# Patient Record
Sex: FEMALE | Race: WHITE | NOT HISPANIC OR LATINO | Employment: FULL TIME | URBAN - METROPOLITAN AREA
[De-identification: names, ages, dates, MRNs, and addresses within clinical notes are randomized per-mention and may not be internally consistent; named-entity substitution may affect disease eponyms.]

---

## 2017-07-12 ENCOUNTER — OFFICE VISIT (OUTPATIENT)
Dept: URGENT CARE | Facility: CLINIC | Age: 27
End: 2017-07-12
Payer: COMMERCIAL

## 2017-07-12 VITALS
DIASTOLIC BLOOD PRESSURE: 80 MMHG | SYSTOLIC BLOOD PRESSURE: 114 MMHG | OXYGEN SATURATION: 100 % | TEMPERATURE: 98 F | RESPIRATION RATE: 14 BRPM | HEART RATE: 70 BPM

## 2017-07-12 DIAGNOSIS — S05.01XA CORNEAL ABRASION, RIGHT, INITIAL ENCOUNTER: Primary | ICD-10-CM

## 2017-07-12 PROCEDURE — 99203 OFFICE O/P NEW LOW 30 MIN: CPT | Mod: S$GLB,,, | Performed by: FAMILY MEDICINE

## 2017-07-12 RX ORDER — DEXTROAMPHETAMINE SACCHARATE, AMPHETAMINE ASPARTATE, DEXTROAMPHETAMINE SULFATE AND AMPHETAMINE SULFATE 5; 5; 5; 5 MG/1; MG/1; MG/1; MG/1
20 TABLET ORAL
COMMUNITY
Start: 2017-06-13 | End: 2017-07-13

## 2017-07-12 RX ORDER — SERTRALINE HYDROCHLORIDE 100 MG/1
100 TABLET, FILM COATED ORAL
COMMUNITY
Start: 2017-03-27 | End: 2024-02-02 | Stop reason: SDUPTHER

## 2017-07-12 NOTE — PROGRESS NOTES
Subjective:       Patient ID: Svetlana Marroquin is a 26 y.o. female.    Chief Complaint: Eye Problem (PT C/O RIGHT EYE PRESSURE, DENIES ANY VISION CHANGES, MILD REDNESS NOTED, PT STATED A PIECE OF PAPER GOT IN HER EYE YESTERDAY, EYE BECAME IRRITATED AND WATERY, OTC EYE DROPS WITH MILD RELIEF)    Eye Problem    The right eye is affected. This is a new problem. The current episode started in the past 7 days. The problem occurs constantly. The problem has been unchanged. The injury mechanism was a foreign body. The pain is at a severity of 2/10. There is no known exposure to pink eye. She does not wear contacts. Associated symptoms include eye redness and itching. Pertinent negatives include no blurred vision, fever, nausea, photophobia or vomiting. She has tried eye drops for the symptoms. The treatment provided mild relief.     Review of Systems   Constitution: Negative for fever.   HENT: Negative for congestion and headaches.    Eyes: Positive for itching, pain and redness. Negative for blurred vision and photophobia.   Gastrointestinal: Negative for nausea and vomiting.       Objective:      Physical Exam   Eyes: Conjunctivae, EOM and lids are normal. Lids are everted and swept, no foreign bodies found. No foreign body present in the right eye.           Assessment:       1. Corneal abrasion, right, initial encounter        Plan:     gentamycin oinment installed and eye rest discussed with patient ,she will see opthalmology tomm am if needed

## 2017-07-12 NOTE — PROGRESS NOTES
Subjective:       Patient ID: Svetlana Marroquin is a 26 y.o. female.    Chief Complaint:   HPI  ROS    Objective:      Physical Exam    Assessment:       No diagnosis found.    Plan:

## 2018-06-07 ENCOUNTER — OFFICE VISIT (OUTPATIENT)
Dept: URGENT CARE | Facility: CLINIC | Age: 28
End: 2018-06-07
Payer: COMMERCIAL

## 2018-06-07 VITALS
WEIGHT: 153 LBS | HEART RATE: 120 BPM | TEMPERATURE: 99 F | BODY MASS INDEX: 22.66 KG/M2 | RESPIRATION RATE: 16 BRPM | DIASTOLIC BLOOD PRESSURE: 90 MMHG | OXYGEN SATURATION: 100 % | SYSTOLIC BLOOD PRESSURE: 131 MMHG | HEIGHT: 69 IN

## 2018-06-07 DIAGNOSIS — J32.9 RHINOSINUSITIS: ICD-10-CM

## 2018-06-07 DIAGNOSIS — J02.9 VIRAL PHARYNGITIS: Primary | ICD-10-CM

## 2018-06-07 LAB
CTP QC/QA: YES
S PYO RRNA THROAT QL PROBE: NEGATIVE

## 2018-06-07 PROCEDURE — 99214 OFFICE O/P EST MOD 30 MIN: CPT | Mod: 25,S$GLB,, | Performed by: NURSE PRACTITIONER

## 2018-06-07 PROCEDURE — 87880 STREP A ASSAY W/OPTIC: CPT | Mod: QW,S$GLB,, | Performed by: NURSE PRACTITIONER

## 2018-06-07 PROCEDURE — 96372 THER/PROPH/DIAG INJ SC/IM: CPT | Mod: S$GLB,,, | Performed by: NURSE PRACTITIONER

## 2018-06-07 RX ORDER — BETAMETHASONE SODIUM PHOSPHATE AND BETAMETHASONE ACETATE 3; 3 MG/ML; MG/ML
6 INJECTION, SUSPENSION INTRA-ARTICULAR; INTRALESIONAL; INTRAMUSCULAR; SOFT TISSUE
Status: COMPLETED | OUTPATIENT
Start: 2018-06-07 | End: 2018-06-07

## 2018-06-07 RX ADMIN — BETAMETHASONE SODIUM PHOSPHATE AND BETAMETHASONE ACETATE 6 MG: 3; 3 INJECTION, SUSPENSION INTRA-ARTICULAR; INTRALESIONAL; INTRAMUSCULAR; SOFT TISSUE at 06:06

## 2018-06-07 NOTE — PROGRESS NOTES
"Subjective:       Patient ID: Svetlana Marroquin is a 27 y.o. female.    Vitals:    06/07/18 1826   BP: (!) 131/90   Pulse: (!) 120   Resp: 16   Temp: 98.8 °F (37.1 °C)   SpO2: 100%   Weight: 69.4 kg (153 lb)   Height: 5' 9" (1.753 m)       Chief Complaint: Sore Throat    Pt state sore throat with some cough and congestion since yesterday.  HR TACHY AND BP ELEVATED READING-TAKING ADDERAL.      Sore Throat    This is a new problem. The current episode started yesterday. The problem has been gradually worsening. There has been no fever. The pain is at a severity of 5/10. Associated symptoms include congestion, coughing and swollen glands. Pertinent negatives include no abdominal pain, diarrhea, headaches, shortness of breath or vomiting. Treatments tried: day quil. The treatment provided no relief.     Review of Systems   Constitution: Positive for malaise/fatigue. Negative for chills and fever.   HENT: Positive for congestion and sore throat.    Eyes: Negative for blurred vision.   Cardiovascular: Negative for chest pain.   Respiratory: Positive for cough. Negative for shortness of breath.    Skin: Negative for rash.   Musculoskeletal: Negative for back pain and joint pain.   Gastrointestinal: Negative for abdominal pain, diarrhea, nausea and vomiting.   Neurological: Negative for headaches.   Psychiatric/Behavioral: The patient is not nervous/anxious.        Objective:      Physical Exam   Constitutional: She is oriented to person, place, and time. She appears well-developed and well-nourished. She is cooperative.  Non-toxic appearance. She does not appear ill. No distress.   HENT:   Head: Normocephalic and atraumatic.   Right Ear: Hearing, tympanic membrane, external ear and ear canal normal.   Left Ear: Hearing, tympanic membrane, external ear and ear canal normal.   Nose: Mucosal edema and rhinorrhea present. No nasal deformity. No epistaxis. Right sinus exhibits no maxillary sinus tenderness and no frontal " sinus tenderness. Left sinus exhibits no maxillary sinus tenderness and no frontal sinus tenderness.   Mouth/Throat: Uvula is midline and mucous membranes are normal. No trismus in the jaw. Normal dentition. No uvula swelling. Posterior oropharyngeal edema and posterior oropharyngeal erythema present.   Eyes: Conjunctivae and lids are normal. No scleral icterus.   Sclera clear bilat   Neck: Trachea normal, full passive range of motion without pain and phonation normal. Neck supple.   Cardiovascular: Regular rhythm, normal heart sounds, intact distal pulses and normal pulses.  Tachycardia present.    Pulmonary/Chest: Effort normal and breath sounds normal. No respiratory distress. She has no decreased breath sounds. She has no wheezes. She has no rhonchi. She has no rales.   Abdominal: Soft. Normal appearance and bowel sounds are normal. She exhibits no distension. There is no tenderness.   Musculoskeletal: Normal range of motion. She exhibits no edema or deformity.   Neurological: She is alert and oriented to person, place, and time. She exhibits normal muscle tone. Coordination normal.   Skin: Skin is warm, dry and intact. She is not diaphoretic. No pallor.   Psychiatric: She has a normal mood and affect. Her speech is normal and behavior is normal. Judgment and thought content normal. Cognition and memory are normal.   Nursing note and vitals reviewed.      Assessment:       1. Viral pharyngitis    2. Rhinosinusitis        Plan:     ADVISED PATIENT TO AVOID PSEUDOEPHEDRINE SINCE SHE HAS ELEVATED HEART RATE AND BLOOD PRESSURE-LIKELY DUE TO HER ADDERAL. ALSO DISCUSSED FOLLOWING UP WITH PCP REGARDING TACHYCARDIA.    Svetlana Chino was seen today for sore throat.    Diagnoses and all orders for this visit:    Viral pharyngitis  -     POCT rapid strep A  -     betamethasone acetate-betamethasone sodium phosphate injection 6 mg; Inject 1 mL (6 mg total) into the muscle one time.  -     (Magic mouthwash) 1:1:1 Benadryl  12.5mg/5ml liq, aluminum & magnesium hydroxide-simehticone (Maalox), lidocaine viscous 2%; Swish and spit 10 mLs every 4 (four) hours as needed.    Rhinosinusitis  -     betamethasone acetate-betamethasone sodium phosphate injection 6 mg; Inject 1 mL (6 mg total) into the muscle one time.      Patient Instructions     You received a steroid shot today - this can elevate your blood pressure, elevate your blood sugar, water weight gain, nervous energy, redness to the face and dimpling of the skin where the shot goes in.       Please drink plenty of fluids.     Please get plenty of rest.     Please return here or go to the Emergency Department for any concerns or worsening of condition.     Sore throat recommendations: Warm fluids, warm salt water gargles, throat lozenges, tea, honey, soup, rest, hydration.    ALTERNATE WARM SALT WATER GARGLES WITH RX MAGIC MOUTH WASH AS DIRECTED.     Use over the counter flonase: one spray each nostril twice daily OR two sprays each nostril once daily.      If you do not have Hypertension or any history of palpitations, it is ok to take over the counter Sudafed or Mucinex D or Allegra-D or Claritin-D or Zyrtec-D.     If you do take one of the above, it is ok to combine that with plain over the counter Mucinex/Robitussin or Allegra or Claritin or Zyrtec.  If for example you are taking Zyrtec -D, you can combine that with Mucinex/Robitussin, but not Mucinex-D.  If you are taking Mucinex-D, you can combine that with plain Allegra or Claritin or Zyrtec.      If you do have Hypertension or palpitations, it is safe to take Coricidin HBP for relief of sinus symptoms.     If not allergic, please take over the counter Tylenol (Acetaminophen) and/or Motrin (Ibuprofen) as directed for control of pain and/or fever.  Please follow up with your primary care doctor or specialist as needed.     If you  smoke, please stop smoking.          Viral Pharyngitis (Sore Throat)    You (or your child, if your  child is the patient) have pharyngitis (sore throat). This infection is caused by a virus. It can cause throat pain that is worse when swallowing, aching all over, headache, and fever. The infection may be spread by coughing, kissing, or touching others after touching your mouth or nose. Antibiotic medications do not work against viruses, so they are not used for treating this condition.  Home care  · If your symptoms are severe, rest at home. Return to work or school when you feel well enough.   · Drink plenty of fluids to avoid dehydration.  · For children: Use acetaminophen for fever, fussiness or discomfort. In infants over six months of age, you may use ibuprofen instead of acetaminophen. (NOTE: If your child has chronic liver or kidney disease or ever had a stomach ulcer or GI bleeding, talk with your doctor before using these medicines.) (NOTE: Aspirin should never be used in anyone under 18 years of age who is ill with a fever. It may cause severe liver damage.)   · For adults: You may use acetaminophen or ibuprofen to control pain or fever, unless another medicine was prescribed for this. (NOTE: If you have chronic liver or kidney disease or ever had a stomach ulcer or GI bleeding, talk with your doctor before using these medicines.)  · Throat lozenges or numbing throat sprays can help reduce pain. Gargling with warm salt water will also help reduce throat pain. For this, dissolve 1/2 teaspoon of salt in 1 glass of warm water. To help soothe a sore throat, children can sip on juice or a popsicle. Children 5 years and older can also suck on a lollipop or hard candy.  · Avoid salty or spicy foods, which can be irritating to the throat.  Follow-up care  Follow up with your healthcare provider or our staff if you are not improving over the next week.  When to seek medical advice  Call your healthcare provider right away if any of these occur:  · Fever as directed by your doctor.  For children, seek care  if:  ? Your child is of any age and has repeated fevers above 104°F (40°C).  ? Your child is younger than 2 years of age and has a fever of 100.4°F (38°C) that continues for more than 1 day.  ? Your child is 2 years old or older and has a fever of 100.4°F (38°C) that continues for more than 3 days.  · New or worsening ear pain, sinus pain, or headache  · Painful lumps in the back of neck  · Stiff neck  · Lymph nodes are getting larger  · Inability to swallow liquids, excessive drooling, or inability to open mouth wide due to throat pain  · Signs of dehydration (very dark urine or no urine, sunken eyes, dizziness)  · Trouble breathing or noisy breathing  · Muffled voice  · New rash  · Child appears to be getting sicker  Date Last Reviewed: 4/13/2015  © 1647-2689 [employer]. [address] [place] [address]. All rights reserved. This information is not intended as a substitute for professional medical care. Always follow your healthcare professional's instructions.       Self-Care for Sore Throats    Sore throats happen for many reasons, such as colds, allergies, and infections caused by viruses or bacteria. In any case, your throat becomes red and sore. Your goal for self-care is to reduce your discomfort while giving your throat a chance to heal.  Moisten and soothe your throat  Tips include the following:  · Try a sip of water first thing after waking up.  · Keep your throat moist by drinking 6 or more glasses of clear liquids every day.  · Run a cool-air humidifier in your room overnight.  · Avoid cigarette smoke.   · Suck on throat lozenges, cough drops, hard candy, ice chips, or frozen fruit-juice bars. Use the sugar-free versions if your diet or medical condition requires them.  Gargle to ease irritation  Gargling every hour or 2 can ease irritation. Try gargling with 1 of these solutions:  · 1/4 teaspoon of salt in 1/2 cup of warm water  · An over-the-counter anesthetic gargle  Use  medicine for more relief  Over-the-counter medicine can reduce sore throat symptoms. Ask your pharmacist if you have questions about which medicine to use:  · Ease pain with anesthetic sprays. Aspirin or an aspirin substitute also helps. Remember, never give aspirin to anyone 18 or younger, or if you are already taking blood thinners.   · For sore throats caused by allergies, try antihistamines to block the allergic reaction.  · Remember: unless a sore throat is caused by a bacterial infection, antibiotics wont help you.  Prevent future sore throats  Prevention tips include the following:  · Stop smoking or reduce contact with secondhand smoke. Smoke irritates the tender throat lining.  · Limit contact with pets and with allergy-causing substances, such as pollen and mold.  · When youre around someone with a sore throat or cold, wash your hands often to keep viruses or bacteria from spreading.  · Dont strain your vocal cords.  Call your healthcare provider  Contact your healthcare provider if you have:  · A temperature over 101°F (38.3°C)  · White spots on the throat  · Great difficulty swallowing  · Trouble breathing  · A skin rash  · Recent exposure to someone else with strep bacteria  · Severe hoarseness and swollen glands in the neck or jaw   Date Last Reviewed: 8/1/2016  © 8900-4429 [employer]. [address] [place] [address], [place] [address]. All rights reserved. This information is not intended as a substitute for professional medical care. Always follow your healthcare professional's instructions.

## 2018-06-07 NOTE — PATIENT INSTRUCTIONS
You received a steroid shot today - this can elevate your blood pressure, elevate your blood sugar, water weight gain, nervous energy, redness to the face and dimpling of the skin where the shot goes in.       Please drink plenty of fluids.     Please get plenty of rest.     Please return here or go to the Emergency Department for any concerns or worsening of condition.     Sore throat recommendations: Warm fluids, warm salt water gargles, throat lozenges, tea, honey, soup, rest, hydration.    ALTERNATE WARM SALT WATER GARGLES WITH RX MAGIC MOUTH WASH AS DIRECTED.     Use over the counter flonase: one spray each nostril twice daily OR two sprays each nostril once daily.      If you do not have Hypertension or any history of palpitations, it is ok to take over the counter Sudafed or Mucinex D or Allegra-D or Claritin-D or Zyrtec-D.     If you do take one of the above, it is ok to combine that with plain over the counter Mucinex/Robitussin or Allegra or Claritin or Zyrtec.  If for example you are taking Zyrtec -D, you can combine that with Mucinex/Robitussin, but not Mucinex-D.  If you are taking Mucinex-D, you can combine that with plain Allegra or Claritin or Zyrtec.      If you do have Hypertension or palpitations, it is safe to take Coricidin HBP for relief of sinus symptoms.     If not allergic, please take over the counter Tylenol (Acetaminophen) and/or Motrin (Ibuprofen) as directed for control of pain and/or fever.  Please follow up with your primary care doctor or specialist as needed.     If you  smoke, please stop smoking.          Viral Pharyngitis (Sore Throat)    You (or your child, if your child is the patient) have pharyngitis (sore throat). This infection is caused by a virus. It can cause throat pain that is worse when swallowing, aching all over, headache, and fever. The infection may be spread by coughing, kissing, or touching others after touching your mouth or nose. Antibiotic medications do not  work against viruses, so they are not used for treating this condition.  Home care  · If your symptoms are severe, rest at home. Return to work or school when you feel well enough.   · Drink plenty of fluids to avoid dehydration.  · For children: Use acetaminophen for fever, fussiness or discomfort. In infants over six months of age, you may use ibuprofen instead of acetaminophen. (NOTE: If your child has chronic liver or kidney disease or ever had a stomach ulcer or GI bleeding, talk with your doctor before using these medicines.) (NOTE: Aspirin should never be used in anyone under 18 years of age who is ill with a fever. It may cause severe liver damage.)   · For adults: You may use acetaminophen or ibuprofen to control pain or fever, unless another medicine was prescribed for this. (NOTE: If you have chronic liver or kidney disease or ever had a stomach ulcer or GI bleeding, talk with your doctor before using these medicines.)  · Throat lozenges or numbing throat sprays can help reduce pain. Gargling with warm salt water will also help reduce throat pain. For this, dissolve 1/2 teaspoon of salt in 1 glass of warm water. To help soothe a sore throat, children can sip on juice or a popsicle. Children 5 years and older can also suck on a lollipop or hard candy.  · Avoid salty or spicy foods, which can be irritating to the throat.  Follow-up care  Follow up with your healthcare provider or our staff if you are not improving over the next week.  When to seek medical advice  Call your healthcare provider right away if any of these occur:  · Fever as directed by your doctor.  For children, seek care if:  ? Your child is of any age and has repeated fevers above 104°F (40°C).  ? Your child is younger than 2 years of age and has a fever of 100.4°F (38°C) that continues for more than 1 day.  ? Your child is 2 years old or older and has a fever of 100.4°F (38°C) that continues for more than 3 days.  · New or worsening ear  pain, sinus pain, or headache  · Painful lumps in the back of neck  · Stiff neck  · Lymph nodes are getting larger  · Inability to swallow liquids, excessive drooling, or inability to open mouth wide due to throat pain  · Signs of dehydration (very dark urine or no urine, sunken eyes, dizziness)  · Trouble breathing or noisy breathing  · Muffled voice  · New rash  · Child appears to be getting sicker  Date Last Reviewed: 4/13/2015 © 2000-2017 [employer]. [address] [place] [address], [place] [address]. All rights reserved. This information is not intended as a substitute for professional medical care. Always follow your healthcare professional's instructions.       Self-Care for Sore Throats    Sore throats happen for many reasons, such as colds, allergies, and infections caused by viruses or bacteria. In any case, your throat becomes red and sore. Your goal for self-care is to reduce your discomfort while giving your throat a chance to heal.  Moisten and soothe your throat  Tips include the following:  · Try a sip of water first thing after waking up.  · Keep your throat moist by drinking 6 or more glasses of clear liquids every day.  · Run a cool-air humidifier in your room overnight.  · Avoid cigarette smoke.   · Suck on throat lozenges, cough drops, hard candy, ice chips, or frozen fruit-juice bars. Use the sugar-free versions if your diet or medical condition requires them.  Gargle to ease irritation  Gargling every hour or 2 can ease irritation. Try gargling with 1 of these solutions:  · 1/4 teaspoon of salt in 1/2 cup of warm water  · An over-the-counter anesthetic gargle  Use medicine for more relief  Over-the-counter medicine can reduce sore throat symptoms. Ask your pharmacist if you have questions about which medicine to use:  · Ease pain with anesthetic sprays. Aspirin or an aspirin substitute also helps. Remember, never give aspirin to anyone 18 or younger, or if you are already taking blood  thinners.   · For sore throats caused by allergies, try antihistamines to block the allergic reaction.  · Remember: unless a sore throat is caused by a bacterial infection, antibiotics wont help you.  Prevent future sore throats  Prevention tips include the following:  · Stop smoking or reduce contact with secondhand smoke. Smoke irritates the tender throat lining.  · Limit contact with pets and with allergy-causing substances, such as pollen and mold.  · When youre around someone with a sore throat or cold, wash your hands often to keep viruses or bacteria from spreading.  · Dont strain your vocal cords.  Call your healthcare provider  Contact your healthcare provider if you have:  · A temperature over 101°F (38.3°C)  · White spots on the throat  · Great difficulty swallowing  · Trouble breathing  · A skin rash  · Recent exposure to someone else with strep bacteria  · Severe hoarseness and swollen glands in the neck or jaw   Date Last Reviewed: 8/1/2016  © 5283-4407 [employer]. [address] [place] [address], [place] [address]. All rights reserved. This information is not intended as a substitute for professional medical care. Always follow your healthcare professional's instructions.

## 2020-04-21 DIAGNOSIS — Z01.84 ANTIBODY RESPONSE EXAMINATION: ICD-10-CM

## 2020-05-21 DIAGNOSIS — Z01.84 ANTIBODY RESPONSE EXAMINATION: ICD-10-CM

## 2020-06-20 DIAGNOSIS — Z01.84 ANTIBODY RESPONSE EXAMINATION: ICD-10-CM

## 2020-07-20 DIAGNOSIS — Z01.84 ANTIBODY RESPONSE EXAMINATION: ICD-10-CM

## 2020-08-19 DIAGNOSIS — Z01.84 ANTIBODY RESPONSE EXAMINATION: ICD-10-CM

## 2020-09-18 DIAGNOSIS — Z01.84 ANTIBODY RESPONSE EXAMINATION: ICD-10-CM

## 2020-10-18 DIAGNOSIS — Z01.84 ANTIBODY RESPONSE EXAMINATION: ICD-10-CM

## 2020-11-17 DIAGNOSIS — Z01.84 ANTIBODY RESPONSE EXAMINATION: ICD-10-CM

## 2022-01-13 ENCOUNTER — OCCUPATIONAL HEALTH (OUTPATIENT)
Dept: URGENT CARE | Facility: CLINIC | Age: 32
End: 2022-01-13

## 2022-01-13 VITALS
HEART RATE: 100 BPM | WEIGHT: 153 LBS | TEMPERATURE: 98 F | SYSTOLIC BLOOD PRESSURE: 108 MMHG | RESPIRATION RATE: 16 BRPM | DIASTOLIC BLOOD PRESSURE: 82 MMHG | BODY MASS INDEX: 22.66 KG/M2 | HEIGHT: 69 IN | OXYGEN SATURATION: 99 %

## 2022-01-13 DIAGNOSIS — A15.0 TB (PULMONARY TUBERCULOSIS): Primary | ICD-10-CM

## 2022-01-13 PROCEDURE — 71045 X-RAY EXAM CHEST 1 VIEW: CPT | Mod: S$GLB,,, | Performed by: RADIOLOGY

## 2022-01-13 PROCEDURE — 71045 XR CHEST 1 VIEW: ICD-10-PCS | Mod: S$GLB,,, | Performed by: RADIOLOGY

## 2022-01-13 NOTE — PROGRESS NOTES
Subjective:       Patient ID: Svetlana Marroquin is a 31 y.o. female.    Chief Complaint: PPD    Pt presents to urgent care for a xray for tb.  She has paperwork that she needs filled out for work.  She has had a positive TB in the past.     Other  This is a new problem. The current episode started today. The problem occurs constantly. Nothing aggravates the symptoms. She has tried nothing for the symptoms. The treatment provided no relief.     ROS     Objective:      Physical Exam    Assessment:       No diagnosis found.    Plan:                   No follow-ups on file.

## 2024-02-02 PROBLEM — F33.9 MAJOR DEPRESSION, RECURRENT, CHRONIC: Status: ACTIVE | Noted: 2017-10-05

## 2024-02-02 PROBLEM — F41.9 ANXIETY: Status: ACTIVE | Noted: 2024-02-02

## 2024-02-02 PROBLEM — Z87.59 HISTORY OF ECTOPIC PREGNANCY: Status: ACTIVE | Noted: 2023-06-16

## 2024-02-02 PROBLEM — F90.0 ATTENTION DEFICIT HYPERACTIVITY DISORDER (ADHD), PREDOMINANTLY INATTENTIVE TYPE: Status: ACTIVE | Noted: 2024-02-02

## 2024-02-02 PROBLEM — F90.2 ATTENTION DEFICIT HYPERACTIVITY DISORDER (ADHD), COMBINED TYPE: Status: ACTIVE | Noted: 2024-02-02

## 2024-02-02 PROBLEM — N80.9 ENDOMETRIOSIS: Status: ACTIVE | Noted: 2024-02-02

## 2024-02-02 PROBLEM — L70.0 ACNE VULGARIS: Status: ACTIVE | Noted: 2020-10-01

## 2024-03-16 ENCOUNTER — HOSPITAL ENCOUNTER (OUTPATIENT)
Dept: RADIOLOGY | Facility: HOSPITAL | Age: 34
Discharge: HOME | End: 2024-03-16
Attending: STUDENT IN AN ORGANIZED HEALTH CARE EDUCATION/TRAINING PROGRAM
Payer: COMMERCIAL

## 2024-03-16 DIAGNOSIS — Z92.89 HISTORY OF POSITIVE PPD: ICD-10-CM

## 2024-03-16 DIAGNOSIS — Z11.1 ENCOUNTER FOR SCREENING FOR RESPIRATORY TUBERCULOSIS: ICD-10-CM

## 2024-03-16 PROCEDURE — 71046 X-RAY EXAM CHEST 2 VIEWS: CPT | Mod: TC,FY,PO

## 2024-03-16 PROCEDURE — 71046 X-RAY EXAM CHEST 2 VIEWS: CPT | Mod: 26,,, | Performed by: RADIOLOGY

## 2024-04-12 ENCOUNTER — OFFICE VISIT (OUTPATIENT)
Dept: OBSTETRICS AND GYNECOLOGY | Facility: CLINIC | Age: 34
End: 2024-04-12
Payer: COMMERCIAL

## 2024-04-12 VITALS
BODY MASS INDEX: 29.06 KG/M2 | DIASTOLIC BLOOD PRESSURE: 70 MMHG | HEIGHT: 69 IN | RESPIRATION RATE: 18 BRPM | WEIGHT: 196.19 LBS | SYSTOLIC BLOOD PRESSURE: 108 MMHG

## 2024-04-12 DIAGNOSIS — Z87.59 HISTORY OF ECTOPIC PREGNANCY: ICD-10-CM

## 2024-04-12 DIAGNOSIS — N80.9 ENDOMETRIOSIS: ICD-10-CM

## 2024-04-12 DIAGNOSIS — Z01.419 ENCOUNTER FOR ROUTINE GYNECOLOGICAL EXAMINATION WITH PAPANICOLAOU SMEAR OF CERVIX: Primary | ICD-10-CM

## 2024-04-12 PROBLEM — E66.3 OVERWEIGHT (BMI 25.0-29.9): Status: ACTIVE | Noted: 2024-04-12

## 2024-04-12 PROCEDURE — 1160F RVW MEDS BY RX/DR IN RCRD: CPT | Mod: CPTII,S$GLB,, | Performed by: OBSTETRICS & GYNECOLOGY

## 2024-04-12 PROCEDURE — 1159F MED LIST DOCD IN RCRD: CPT | Mod: CPTII,S$GLB,, | Performed by: OBSTETRICS & GYNECOLOGY

## 2024-04-12 PROCEDURE — 99999 PR PBB SHADOW E&M-EST. PATIENT-LVL III: CPT | Mod: PBBFAC,,, | Performed by: OBSTETRICS & GYNECOLOGY

## 2024-04-12 PROCEDURE — 3008F BODY MASS INDEX DOCD: CPT | Mod: CPTII,S$GLB,, | Performed by: OBSTETRICS & GYNECOLOGY

## 2024-04-12 PROCEDURE — 88141 CYTOPATH C/V INTERPRET: CPT | Mod: ,,, | Performed by: PATHOLOGY

## 2024-04-12 PROCEDURE — 3074F SYST BP LT 130 MM HG: CPT | Mod: CPTII,S$GLB,, | Performed by: OBSTETRICS & GYNECOLOGY

## 2024-04-12 PROCEDURE — 99395 PREV VISIT EST AGE 18-39: CPT | Mod: S$GLB,,, | Performed by: OBSTETRICS & GYNECOLOGY

## 2024-04-12 PROCEDURE — 3078F DIAST BP <80 MM HG: CPT | Mod: CPTII,S$GLB,, | Performed by: OBSTETRICS & GYNECOLOGY

## 2024-04-12 PROCEDURE — 88175 CYTOPATH C/V AUTO FLUID REDO: CPT | Performed by: PATHOLOGY

## 2024-04-12 NOTE — PROGRESS NOTES
Chief Complaint   Patient presents with    [facility]    Well Woman       History and Physical:  No LMP recorded. (Menstrual status: Birth Control).       Svetlana Marroquin is a 33 y.o. No obstetric history on file. WF who presents today for her routine annual GYN exam. The patient has no Gynecology complaints today.       Allergies:   Review of patient's allergies indicates:   Allergen Reactions    No known drug allergies        Past Medical History:   Diagnosis Date    ADHD (attention deficit hyperactivity disorder)     Allergy     Anxiety     Endometriosis     History of ectopic pregnancy 12/2022       Past Surgical History:   Procedure Laterality Date    APPENDECTOMY      CHOLECYSTECTOMY  01/2023    LAPAROSCOPIC SALPINGECTOMY  12/2022    LAPAROSCOPY W/ MINI-LAPAROTOMY         MEDS:   Current Outpatient Medications on File Prior to Visit   Medication Sig Dispense Refill    dextroamphetamine-amphetamine (ADDERALL) 20 mg tablet Take two pills in the morning and one pill in the afternoon daily 90 tablet 0    dextroamphetamine-amphetamine (ADDERALL) 20 mg tablet Take two pills in the morning and one pill in the afternoon daily 90 tablet 0    dextroamphetamine-amphetamine (ADDERALL) 20 mg tablet Take two pills in the morning and one pill in the afternoon daily 90 tablet 0    loratadine 10 mg Cap Take 10 mg by mouth once daily.      norgestrel-ethinyl estradioL (CRYSELLE, 28,) 0.3-30 mg-mcg per tablet Take 1 tablet by mouth once daily. 90 tablet 3    sertraline (ZOLOFT) 100 MG tablet Take 1 tablet (100 mg total) by mouth once daily. 90 tablet 3     No current facility-administered medications on file prior to visit.       OB History    No obstetric history on file.         Social History     Socioeconomic History    Marital status: Single   Tobacco Use    Smoking status: Never    Smokeless tobacco: Never   Substance and Sexual Activity    Alcohol use: Yes     Comment: socially    Drug use: Never   Social  History Narrative    ** Merged History Encounter **          Social Determinants of Health     Financial Resource Strain: Low Risk  (2/2/2024)    Overall Financial Resource Strain (CARDIA)     Difficulty of Paying Living Expenses: Not very hard   Food Insecurity: No Food Insecurity (2/2/2024)    Hunger Vital Sign     Worried About Running Out of Food in the Last Year: Never true     Ran Out of Food in the Last Year: Never true   Transportation Needs: No Transportation Needs (2/2/2024)    PRAPARE - Transportation     Lack of Transportation (Medical): No     Lack of Transportation (Non-Medical): No   Physical Activity: Insufficiently Active (2/2/2024)    Exercise Vital Sign     Days of Exercise per Week: 3 days     Minutes of Exercise per Session: 30 min   Stress: No Stress Concern Present (2/2/2024)    [place] [employer] - Occupational Stress Questionnaire     Feeling of Stress : Not at all   Social Connections: Unknown (2/2/2024)    Social Connection and Isolation Panel [NHANES]     Frequency of Communication with Friends and Family: Twice a week     Frequency of Social Gatherings with Friends and Family: Once a week     Active Member of Clubs or Organizations: No     Attends Club or Organization Meetings: Never     Marital Status: Never    Housing Stability: Low Risk  (2/2/2024)    Housing Stability Vital Sign     Unable to Pay for Housing in the Last Year: No     Number of Places Lived in the Last Year: 2     Unstable Housing in the Last Year: No       Family History   Problem Relation Age of Onset    Diabetes Maternal Grandmother          Past medical and surgical history reviewed.   I have reviewed the patient's medical history in detail and updated the computerized patient record.        Review of System:   General: no chills, fever, night sweats, weight gain or weight loss  Psychological: no depression or suicidal ideation  Breasts: no new or changing breast lumps, nipple discharge  "or masses.  Respiratory: no cough, shortness of breath, or wheezing  Cardiovascular: no chest pain or dyspnea on exertion  Gastrointestinal: no abdominal pain, change in bowel habits, or black or bloody stools  Genito-Urinary: no incontinence, urinary frequency/urgency or vulvar/vaginal symptoms, pelvic pain or abnormal vaginal bleeding.  Musculoskeletal: no gait disturbance or muscular weakness      Physical Exam:   /70   Resp 18   Ht 5' 9" (1.753 m)   Wt 89 kg (196 lb 3.4 oz)   BMI 28.98 kg/m²   Constitutional: She appears alert and responsive. She appears well-developed, well-groomed, and well-nourished. No distress.   HENT:   Head: Normocephalic and atraumatic.   Eyes: Conjunctivae and EOM are normal. No scleral icterus.   Neck: Symmetrical. Normal range of motion. Neck supple. No tracheal deviation present. THYROID:  without masses or tenderness.  Cardiovascular: Normal rate, no rhythm abnormality noted. Extremities without swelling or edema, warm.    Pulmonary/Chest: Normal respiratory Effort. No distress or retractions. She exhibits no tenderness.  Breasts: are symmetrical.no masses    Right breast exhibits no inverted nipple, no mass, no nipple discharge, no skin change and no tenderness.   Left breast exhibits no inverted nipple, no mass, no nipple discharge, no skin change and no tenderness.  Abdominal: Soft. She exhibits no distension, hernias or masses. There is no tenderness. No enlargement of liver edge or spleen.  There is no rebound and no guarding.   Genitourinary:    External rectal exam shows no thrombosed external hemorrhoids, no lesions.     Pelvic exam was performed with patient supine.   No labial fusion, and symmetrical.    There is no rash, lesion or injury on the right labia.   There is no rash, lesion or injury on the left labia.   No bleeding and no signs of injury around the vaginal introitus, urethral meatus is normal size and without prolapse or lesions, urethra well " supported. The cervix is visualized with no discharge, lesions or friability.   No vaginal discharge found.    No significant Cystocele, Enterocele or rectocele, and cervix and uterus well supported.   Bimanual exam:   The urethra is normal to palpation and there are no palpable vaginal wall masses.   Uterus is not deviated, not enlarged, not fixed, normal shape and not tender.   Cervix exhibits no motion tenderness.    Right adnexum displays no mass or nodularity and no tenderness.   Left adnexum displays no mass or nodularity and no tenderness.  Musculoskeletal: Normal range of motion.   Lymphadenopathy: No inguinal adenopathy present.   Neurological: She is alert and oriented to person, place, and time. Coordination normal.   Skin: Skin is warm and dry. She is not diaphoretic. No rashes, lesions or ulcers.   Psychiatric: She has a normal mood and affect, oriented to person, place, and time.      Assessment:   Normal annual GYN exam  1. Encounter for routine gynecological examination with Papanicolaou smear of cervix  Liquid-Based Pap Smear, Screening    HPV High Risk Genotypes, PCR      2. Endometriosis        3. History of ectopic pregnancy              Plan:   PAP  On OCPs  Follow up in 1 year.  Patient informed will be contacted with results within 2 weeks. Encouraged to please call back or email if she has not heard from us by then.

## 2024-04-23 LAB
FINAL PATHOLOGIC DIAGNOSIS: NORMAL
Lab: NORMAL

## 2024-06-28 PROBLEM — F33.9 MAJOR DEPRESSION, RECURRENT, CHRONIC: Status: RESOLVED | Noted: 2017-10-05 | Resolved: 2024-06-28

## 2025-07-28 ENCOUNTER — E-VISIT (OUTPATIENT)
Dept: URGENT CARE | Facility: CLINIC | Age: 35
End: 2025-07-28
Payer: COMMERCIAL

## 2025-07-28 DIAGNOSIS — N76.0 ACUTE VAGINITIS: Primary | ICD-10-CM

## 2025-07-28 RX ORDER — FLUCONAZOLE 150 MG/1
150 TABLET ORAL ONCE
Qty: 1 TABLET | Refills: 1 | Status: SHIPPED | OUTPATIENT
Start: 2025-07-28 | End: 2025-07-28

## 2025-07-28 NOTE — PROGRESS NOTES
Patient ID: Svetlana Marroquin is a 34 y.o. female.        E-Visit Time Tracking:   Day 1 Time (in minutes): 5  Total Time (in minutes): 5      Chief Complaint: General Illness (Entered automatically based on patient selection in [employer].)      The patient initiated a request through [employer] on 7/28/2025 for evaluation and management with a chief complaint of General Illness (Entered automatically based on patient selection in [employer].)     I evaluated the questionnaire submission on 07/28/2025.    [facility] Peq Evisit Supergroup-Common Problems    7/28/2025 12:17 PM CDT - Filed by Patient   What do you need help with? Urinary Symptoms   Do you agree to participate in an E-Visit? Yes   If you have any of the following symptoms, please go to the nearest emergency room or call 911: I acknowledge   Do you have any of the following pregnancy-related conditions? (Pregnant, Possibly pregnant, Breast feeding, None) None   What is the main issue you would like addressed today? Treated for yeast infection   Do you have any of the following symptoms? ( Discomfort or pain passing urine, Passing urine more frequently, Cloudy urine, Discharge in urine, Other, None) Other   What urinary symptoms are you experiencing? Itch   When did your concern begin? 7/27/2025   What medications or treatments have you used to help your symptoms? (Antibiotics, Cranberry products, Drinking more fluids, Painkillers, Other, None) Drinking more fluids   What does your urine look like? (Clear, Cloudy, Dark yellow, Light yellow, Pink or red (bloody), Foamy, Not sure) Cloudy   Have you had any of the following symptoms during the past 24 hours?   Urgency (a sudden and uncontrollable urge to urinate) (None, Mild, Moderate, Severe) None   Frequency (going to the toilet very often) (None, Mild, Moderate, Severe) None   Burning pain when urinating (None, Mild, Moderate, Severe) None   Incomplete bladder emptying (still feel like you need to urinate again  after urination) (None, Mild, Moderate, Severe) None   Pain in the lower belly when youre not urinating. (None, Mild, Moderate, Severe) None   Please rate how much discomfort these symptoms have caused in the last 24 hours. (None, Mild, Moderate, Severe) None   Have you had any of the following symptoms during the past 24 hours?   Blood seen in the urine (None, Mild, Moderate, Severe) None   Pain in the lower back on one or both sides (flank pain) (None, Mild, Moderate, Severe) None   Abnormal Vaginal Discharge (abnormal amount, color and/or odor) (None, Mild, Moderate, Severe) Mild   Discharge from the opening you urinate from (urethra) when not urinating. (None, Mild, Moderate, Severe) Mild   Tell us how the symptoms have interfered with your every day activities/work in the past 24 hours. (None, Mild, Moderate, Severe) Mild   Do you have a fever? (Less than 100.4°F, 100.4°F or greater, No, Not sure) No   Are you a diabetic? No   If you are female, please tell us if you have any of the following right now (as youre completing the questionnaire): (Menstrual period (menses), PMS (Premenstrual syndrome),Signs of menopause such as hot flashes, Pregnancy, None) None   Do you have a history of kidney stones? No   Provide any information you feel is important to your history not asked above    Please attach any relevant images or files (if you have performed a home test for UTI, please submit a photo of results)    Are you able to take your vitals? No         Encounter Diagnosis   Name Primary?    Acute vaginitis Yes        No orders of the defined types were placed in this encounter.     Medications Ordered This Encounter   Medications    fluconazole (DIFLUCAN) 150 MG Tab     Sig: Take 1 tablet (150 mg total) by mouth once. for 1 dose     Dispense:  1 tablet     Refill:  1        No follow-ups on file.